# Patient Record
Sex: FEMALE | Race: BLACK OR AFRICAN AMERICAN | NOT HISPANIC OR LATINO | Employment: FULL TIME | URBAN - METROPOLITAN AREA
[De-identification: names, ages, dates, MRNs, and addresses within clinical notes are randomized per-mention and may not be internally consistent; named-entity substitution may affect disease eponyms.]

---

## 2018-02-07 ENCOUNTER — OFFICE VISIT (OUTPATIENT)
Dept: URGENT CARE | Age: 38
End: 2018-02-07
Payer: COMMERCIAL

## 2018-02-07 VITALS
RESPIRATION RATE: 20 BRPM | BODY MASS INDEX: 28.68 KG/M2 | TEMPERATURE: 98.6 F | HEART RATE: 89 BPM | OXYGEN SATURATION: 99 % | WEIGHT: 168 LBS | HEIGHT: 64 IN | SYSTOLIC BLOOD PRESSURE: 142 MMHG | DIASTOLIC BLOOD PRESSURE: 84 MMHG

## 2018-02-07 DIAGNOSIS — J02.9 SORE THROAT: Primary | ICD-10-CM

## 2018-02-07 DIAGNOSIS — J02.9 ACUTE PHARYNGITIS, UNSPECIFIED ETIOLOGY: ICD-10-CM

## 2018-02-07 LAB — S PYO AG THROAT QL: NEGATIVE

## 2018-02-07 PROCEDURE — G0382 LEV 3 HOSP TYPE B ED VISIT: HCPCS | Performed by: FAMILY MEDICINE

## 2018-02-07 PROCEDURE — 87430 STREP A AG IA: CPT | Performed by: FAMILY MEDICINE

## 2018-02-07 RX ORDER — AMOXICILLIN 500 MG/1
500 CAPSULE ORAL EVERY 12 HOURS SCHEDULED
Qty: 20 CAPSULE | Refills: 0 | Status: SHIPPED | OUTPATIENT
Start: 2018-02-07 | End: 2018-02-17

## 2018-02-07 RX ORDER — ETONOGESTREL AND ETHINYL ESTRADIOL 11.7; 2.7 MG/1; MG/1
1 INSERT, EXTENDED RELEASE VAGINAL
COMMUNITY

## 2018-02-07 NOTE — PATIENT INSTRUCTIONS
Rapid strep negative  Will treat as you have had symptoms for 1 week  Start antibiotic  Take probiotic  Tylenol or motrin as needed for pain or fever  Salt water gargles and throat lozenges as needed  Chloraseptic spray may help with pain  You can also try antihistamine or decongestant for post nasal drip  Follow up with PCP if no improvement  Go to ER with worsening symptoms  Pharyngitis   AMBULATORY CARE:   Pharyngitis , or sore throat, is inflammation of the tissues and structures in your pharynx (throat)  Pharyngitis is most often caused by bacteria  It may also be caused by a cold or flu virus  Other causes include smoking, allergies, or acid reflux  Signs and symptoms that may occur with pharyngitis:   · Sore throat or pain when you swallow    · Fever, chills, and body aches    · Hoarse or raspy voice    · Cough, runny or stuffy nose, itchy or watery eyes    · Headache    · Upset stomach and loss of appetite    · Mild neck stiffness    · Swollen glands that feel like hard lumps when you touch your neck    · White and yellow pus-filled blisters in the back of your throat  Call 911 for any of the following:   · You have trouble breathing or swallowing because your throat is swollen or sore  Seek care immediately if:   · You are drooling because it hurts too much to swallow  · Your fever is higher than 102? F (39?C) or lasts longer than 3 days  · You are confused  · You taste blood in your throat  Contact your healthcare provider if:   · Your throat pain gets worse  · You have a painful lump in your throat that does not go away after 5 days  · Your symptoms do not improve after 5 days  · You have questions or concerns about your condition or care  Treatment for pharyngitis:  Viral pharyngitis will go away on its own without treatment  Your sore throat should start to feel better in 3 to 5 days for both viral and bacterial infections   You may need any of the following  · NSAIDs , such as ibuprofen, help decrease swelling, pain, and fever  NSAIDs can cause stomach bleeding or kidney problems in certain people  If you take blood thinner medicine, always ask your healthcare provider if NSAIDs are safe for you  Always read the medicine label and follow directions  · Acetaminophen  decreases pain and fever  It is available without a doctor's order  Ask how much to take and how often to take it  Follow directions  Acetaminophen can cause liver damage if not taken correctly  Manage your symptoms:   · Gargle salt water  Mix ¼ teaspoon salt in an 8 ounce glass of warm water and gargle  This may help decrease swelling in your throat  · Drink liquids as directed  You may need to drink more liquids than usual  Liquids may help soothe your throat and prevent dehydration  Ask how much liquid to drink each day and which liquids are best for you  · Use a cool-steam humidifier  to help moisten the air in your room and calm your cough  · Soothe your throat  with cough drops, ice, soft foods, or popsicles  Prevent the spread of pharyngitis:  Cover your mouth and nose when you cough or sneeze  Do not share food or drinks  Wash your hands often  Use soap and water  If soap and water are unavailable, use an alcohol based hand   Follow up with your healthcare provider as directed:  Write down your questions so you remember to ask them during your visits  © 2017 [employer] Information is for End User's use only and may not be sold, redistributed or otherwise used for commercial purposes  All illustrations and images included in CareNotes® are the copyrighted property of [employer]  or [employer]  The above information is an  only  It is not intended as medical advice for individual conditions or treatments   Talk to your doctor, nurse or pharmacist before following any medical regimen to see if it is safe and effective for you

## 2018-02-07 NOTE — PROGRESS NOTES
[facility]        NAME: Perri Hare is a 40 y o  female  : 1980    MRN: [id number]  DATE: 2018  TIME: 5:06 PM    Assessment and Plan   Sore throat [J02 9]  1  Sore throat  POCT rapid strepA   2  Acute pharyngitis, unspecified etiology  amoxicillin (AMOXIL) 500 mg capsule         Patient Instructions     Patient Instructions   Rapid strep negative  Will treat as you have had symptoms for 1 week  Start antibiotic  Take probiotic  Tylenol or motrin as needed for pain or fever  Salt water gargles and throat lozenges as needed  Chloraseptic spray may help with pain  You can also try antihistamine or decongestant for post nasal drip  Follow up with PCP if no improvement  Go to ER with worsening symptoms  Chief Complaint     Chief Complaint   Patient presents with    Headache     it's  been for 1 week    Sore Throat    Cough    Generalized Body Aches         History of Present Illness   Perri Hare presents to the clinic c/o    This is a 40year old male here today with sore throat, cough, body aches and headache  She has had symptoms for about 1 week  Headache is intermittent  She denies sinus pressure or pain  She states she has lost her voice  She has been using mucinex  She states cough is primarily dry and nonproductive  She does feel as though she has some post nasal drip  Review of Systems   Review of Systems   Constitutional: Positive for chills  Negative for fever  HENT: Positive for sore throat  Negative for congestion, sinus pain and sinus pressure  Respiratory: Positive for cough  Negative for shortness of breath and wheezing  Cardiovascular: Negative            Current Medications     Long-Term Prescriptions   Medication Sig Dispense Refill    etonogestrel-ethinyl estradiol (NUVARING) 0 12-0 015 MG/24HR vaginal ring Insert 1 each into the vagina every 28 days Insert vaginally and leave in place for 3 consecutive weeks, then remove for 1 week  Current Allergies     Allergies as of 02/07/2018    (No Known Allergies)            The following portions of the patient's history were reviewed and updated as appropriate: allergies, current medications, past family history, past medical history, past social history, past surgical history and problem list     Objective   /84   Pulse 89   Temp 98 6 °F (37 °C) (Temporal)   Resp 20   Ht 5' 4" (1 626 m)   Wt 76 2 kg (168 lb)   LMP 01/12/2018 (Approximate)   SpO2 99%   BMI 28 84 kg/m²        Physical Exam     Physical Exam   Constitutional: She appears well-developed and well-nourished  HENT:   Posterior pharynx erythemic, no exudate  Eyes: Pupils are equal, round, and reactive to light  Neck: Normal range of motion  Neck supple  Cardiovascular: Normal rate and regular rhythm  Pulmonary/Chest: Effort normal and breath sounds normal    Skin: Skin is warm  Psychiatric: She has a normal mood and affect

## 2018-08-02 ENCOUNTER — TELEPHONE (OUTPATIENT)
Dept: FAMILY MEDICINE CLINIC | Facility: CLINIC | Age: 38
End: 2018-08-02